# Patient Record
Sex: MALE | Race: WHITE | Employment: OTHER | URBAN - METROPOLITAN AREA
[De-identification: names, ages, dates, MRNs, and addresses within clinical notes are randomized per-mention and may not be internally consistent; named-entity substitution may affect disease eponyms.]

---

## 2019-01-01 ENCOUNTER — HOSPITAL ENCOUNTER (EMERGENCY)
Age: 69
End: 2019-10-10
Attending: EMERGENCY MEDICINE
Payer: MEDICARE

## 2019-01-01 DIAGNOSIS — I46.9 CARDIAC ARREST (HCC): Primary | ICD-10-CM

## 2019-01-01 PROCEDURE — 99285 EMERGENCY DEPT VISIT HI MDM: CPT

## 2019-01-01 PROCEDURE — 99284 EMERGENCY DEPT VISIT MOD MDM: CPT

## 2019-01-01 PROCEDURE — 92950 HEART/LUNG RESUSCITATION CPR: CPT

## 2019-01-01 PROCEDURE — 96374 THER/PROPH/DIAG INJ IV PUSH: CPT

## 2019-01-01 PROCEDURE — 74011250636 HC RX REV CODE- 250/636: Performed by: EMERGENCY MEDICINE

## 2019-01-01 RX ORDER — EPINEPHRINE 0.1 MG/ML
INJECTION INTRACARDIAC; INTRAVENOUS
Status: COMPLETED | OUTPATIENT
Start: 2019-01-01 | End: 2019-01-01

## 2019-01-01 RX ADMIN — Medication 1 MG: at 09:02

## 2019-10-10 NOTE — PROGRESS NOTES
responded to Death of  Jassi Arias, who was a 71 y.o.,male, The  provided the following Interventions: 
 responded to [employer] that turned into death at the ER. Provided crisis pastoral care, pastoral support and grief interventions to the patient's wife, Valentin Martinez and Javan Stover, patient's nephew/brother. Offered prayers on behalf of the patient.  arrangements made with [facility] on [place], [place]. Yaa's contact info: (M) [phone], (H) [phone] and Maikel's  Rox Chery) [phone] who is currently making decisions for the family. Chart reviewed. Plan: 
Chaplains will continue to follow and will provide pastoral care on an as needed/requested basis and grief support for the family. [facility], [address] [facility]  
[phone]

## 2019-10-10 NOTE — ED NOTES
Provider called time of death at 0900. [facility] notified. Patient moved to the [facility].

## 2019-10-10 NOTE — ED PROVIDER NOTES
EMERGENCY DEPARTMENT HISTORY AND PHYSICAL EXAM 
 
9:20 AM 
 
 
Date: 10/10/2019 Patient Name: Delisa Jimenez History of Presenting Illness No chief complaint on file. History Provided By: EMS Additional History (Context): Delisa Jimenez is a 71 y.o. male with Unknown medical history, reported history of syncope in chart, wife reports recent loop recorder device who presents with cardiac arrest.  Bystanders witnessed the patient walking around, talking crazily, then complained of shortness of breath and fell to the ground, off-duty EMS was on scene, had no pulse, started immediate CPR, EMS rig arrived within 15 minutes, they were on scene 30 minutes, patient is received a total of 3 mg of epinephrine, has been PEA on the monitor the entire time with no changes, no shocks delivered, no other medications given. No other medical history obtained, wife on her way PCP: Unknown, Provider Past History Past Medical History: 
Past Medical History:  
Diagnosis Date  Depression Past Surgical History: No past surgical history on file. Family History: No family history on file. Social History: 
Social History Tobacco Use  Smoking status: Never Smoker Substance Use Topics  Alcohol use: Yes Comment: socially  Drug use: No  
 
 
Allergies: 
No Known Allergies Review of Systems Review of Systems Unable to perform ROS: Acuity of condition Physical Exam  
There were no vitals taken for this visit. Physical Exam  
Constitutional: He appears well-developed. HENT:  
Head: Normocephalic and atraumatic. Eyes: Pupils are equal, round, and reactive to light. Right eye exhibits no discharge. Left eye exhibits no discharge. 5 mm fixed, dilated pupils Neck: Normal range of motion. Neck supple. No JVD present. No tracheal deviation present. Cardiovascular:  
Pulseless Pulmonary/Chest:  
 Bagging relatively easily with [facility] airway, bilateral breath sounds on auscultation Abdominal: Soft. Bowel sounds are normal. He exhibits no distension. There is no tenderness. There is no rebound. Musculoskeletal: Normal range of motion. He exhibits no tenderness or deformity. Neurological:  
Not moving any extremities Skin: Skin is warm and dry. No rash noted. No erythema. Intraosseous device in place in left shoulder, left tibia Diagnostic Study Results Labs - No results found for this or any previous visit (from the past 12 hour(s)). Radiologic Studies - No orders to display Medical Decision Making I am the first provider for this patient. I reviewed the vital signs, available nursing notes, past medical history, past surgical history, family history and social history. Vital Signs-Reviewed the patient's vital signs. Records Reviewed: Nursing Notes and Old Medical Records Provider Notes (Medical Decision Making): MDM Number of Diagnoses or Management Options Cardiac arrest [facility]):  
Diagnosis management comments: Differential Diagnosis: PE, hypovolemia, cardiac arrhythmia, acidosis Patient on arrival had already received approximately 55 minutes of CPR, did have bystander CPR initiated as patient went down and became pulseless, however has remained in PEA despite 3 mg of epinephrine. There was a long delay in obtaining a [place] airway however BLS reports that he was bagging well with an OPA, however by the time patient arrived here, had fixed and dilated 5 mm pupils that were nonreactive.   Does not feel extremely cold to the touch, no point in bicarb, with PEA and his initial complaint of shortness of breath, PE is high suspicion, bedside echo shows moderately enlarged RV however no cardiac activity, 1 further dose of epinephrine given, 3 rounds of CPR and with no pulse change, no cardiac activity on ultrasound, time of death called at 902. Wife does not want autopsy, will d/w ME but doubt it will be a case Cary Page MD 
 
 
 
 
 
 
For Hospitalized Patients: 1. Critical Care Time: Critical Care Time: The services I provided to this patient were to treat and/or prevent clinically significant deterioration that could result in the failure of one or more body systems and/or organ systems due to cardiac arrest. 
 
Services included the following: 
-reviewing nursing notes and old charts 
-vital sign assessments 
-direct patient care 
-medication orders and management 
-interpreting and reviewing diagnostic studies/labs 
-re-evaluations 
-documentation time Aggregate critical care time was 33 minutes, which includes only time during which I was engaged in work directly related to the patient's care as described above, whether I was at bedside or elsewhere in the Emergency Department. It did not include time spent performing other reported procedures or the services of residents, students, nurses, or advance practice providers. Chika Villagomez MD 
 
9:25 AM 
  
 
 
Diagnosis Clinical Impression: 1. Cardiac arrest ([facility] [address] Disposition:  Follow-up Information None Patient's Medications No medications on file  
 
_______________________________ Please note that this dictation was completed with [employer], the computer voice recognition software. Quite often unanticipated grammatical, syntax, homophones, and other interpretive errors are inadvertently transcribed by the computer software. Please disregard these errors. Please excuse any errors that have escaped final proofreading.

## 2019-10-10 NOTE — ED TRIAGE NOTES
Patient arrived to ER via EMS in cardiac arrest. Patient was found altered and then had a witnessed collapse at [address] [place] [address]. Patient was given 1 mg epinephrine push and 2 mg epinephrine drip with 1 liter of normal saline on the medic.

## 2019-10-30 NOTE — ED NOTES
Completing the death certificate as a courtesy to Dr. Rob Johnson die. Patient  on October 10 and today is . The cause of death was determined per chart review.  
 
Jesica Javier MD

## 2019-10-31 NOTE — ED NOTES
I was asked to sign a death certificate due to significant delays and issues with e-signing by the pronouncing provider. There is little medical hx available and searched other health systems. Per family reports the patient had a cardiac hx and there was no autopsy or ME acceptance. Will made cause of death Acute MI due to CAD as this is the most likely cause at this time. I completed the death certificate as the Medical Director for [facility]. Previous attempts to sign were not successful. Faye Christensen,  11:25 AM
